# Patient Record
Sex: MALE | Race: BLACK OR AFRICAN AMERICAN | NOT HISPANIC OR LATINO | Employment: OTHER | URBAN - METROPOLITAN AREA
[De-identification: names, ages, dates, MRNs, and addresses within clinical notes are randomized per-mention and may not be internally consistent; named-entity substitution may affect disease eponyms.]

---

## 2017-06-23 PROCEDURE — 96361 HYDRATE IV INFUSION ADD-ON: CPT

## 2017-06-23 PROCEDURE — 96374 THER/PROPH/DIAG INJ IV PUSH: CPT

## 2017-06-23 PROCEDURE — 99284 EMERGENCY DEPT VISIT MOD MDM: CPT | Mod: 25

## 2017-06-24 ENCOUNTER — HOSPITAL ENCOUNTER (EMERGENCY)
Facility: HOSPITAL | Age: 47
Discharge: HOME | End: 2017-06-24
Attending: EMERGENCY MEDICINE
Payer: COMMERCIAL

## 2017-06-24 VITALS
BODY MASS INDEX: 23.19 KG/M2 | RESPIRATION RATE: 18 BRPM | WEIGHT: 175 LBS | DIASTOLIC BLOOD PRESSURE: 78 MMHG | HEIGHT: 73 IN | HEART RATE: 62 BPM | SYSTOLIC BLOOD PRESSURE: 142 MMHG | TEMPERATURE: 98 F | OXYGEN SATURATION: 96 %

## 2017-06-24 DIAGNOSIS — K59.00 CONSTIPATION, UNSPECIFIED CONSTIPATION TYPE: ICD-10-CM

## 2017-06-24 DIAGNOSIS — R10.84 GENERALIZED ABDOMINAL PAIN: Primary | ICD-10-CM

## 2017-06-24 LAB
ALBUMIN SERPL BCP-MCNC: 4 G/DL
ALP SERPL-CCNC: 59 U/L
ALT SERPL W/O P-5'-P-CCNC: 19 U/L
ANION GAP SERPL CALC-SCNC: 10 MMOL/L
AST SERPL-CCNC: 15 U/L
BASOPHILS # BLD AUTO: 0.03 K/UL
BASOPHILS NFR BLD: 0.6 %
BILIRUB SERPL-MCNC: 0.5 MG/DL
BILIRUB UR QL STRIP: NEGATIVE
BUN SERPL-MCNC: 16 MG/DL
CALCIUM SERPL-MCNC: 9.1 MG/DL
CHLORIDE SERPL-SCNC: 104 MMOL/L
CLARITY UR: CLEAR
CO2 SERPL-SCNC: 27 MMOL/L
COLOR UR: YELLOW
CREAT SERPL-MCNC: 1 MG/DL
DIFFERENTIAL METHOD: NORMAL
EOSINOPHIL # BLD AUTO: 0.2 K/UL
EOSINOPHIL NFR BLD: 4.9 %
ERYTHROCYTE [DISTWIDTH] IN BLOOD BY AUTOMATED COUNT: 12.7 %
EST. GFR  (AFRICAN AMERICAN): >60 ML/MIN/1.73 M^2
EST. GFR  (NON AFRICAN AMERICAN): >60 ML/MIN/1.73 M^2
GLUCOSE SERPL-MCNC: 96 MG/DL
GLUCOSE UR QL STRIP: NEGATIVE
HCT VFR BLD AUTO: 43.4 %
HGB BLD-MCNC: 14.3 G/DL
HGB UR QL STRIP: ABNORMAL
KETONES UR QL STRIP: NEGATIVE
LEUKOCYTE ESTERASE UR QL STRIP: NEGATIVE
LYMPHOCYTES # BLD AUTO: 1.8 K/UL
LYMPHOCYTES NFR BLD: 37.5 %
MCH RBC QN AUTO: 29.5 PG
MCHC RBC AUTO-ENTMCNC: 32.9 %
MCV RBC AUTO: 90 FL
MONOCYTES # BLD AUTO: 0.3 K/UL
MONOCYTES NFR BLD: 5.8 %
NEUTROPHILS # BLD AUTO: 2.4 K/UL
NEUTROPHILS NFR BLD: 51.2 %
NITRITE UR QL STRIP: NEGATIVE
PH UR STRIP: 7 [PH] (ref 5–8)
PLATELET # BLD AUTO: 174 K/UL
PMV BLD AUTO: 9.7 FL
POTASSIUM SERPL-SCNC: 4 MMOL/L
PROT SERPL-MCNC: 7.4 G/DL
PROT UR QL STRIP: NEGATIVE
RBC # BLD AUTO: 4.85 M/UL
SODIUM SERPL-SCNC: 141 MMOL/L
SP GR UR STRIP: 1.01 (ref 1–1.03)
URN SPEC COLLECT METH UR: ABNORMAL
UROBILINOGEN UR STRIP-ACNC: 1 EU/DL
WBC # BLD AUTO: 4.69 K/UL

## 2017-06-24 PROCEDURE — 81003 URINALYSIS AUTO W/O SCOPE: CPT

## 2017-06-24 PROCEDURE — 63600175 PHARM REV CODE 636 W HCPCS: Performed by: NURSE PRACTITIONER

## 2017-06-24 PROCEDURE — 25000003 PHARM REV CODE 250: Performed by: NURSE PRACTITIONER

## 2017-06-24 PROCEDURE — 80053 COMPREHEN METABOLIC PANEL: CPT

## 2017-06-24 PROCEDURE — 85025 COMPLETE CBC W/AUTO DIFF WBC: CPT

## 2017-06-24 RX ORDER — POLYETHYLENE GLYCOL 3350 17 G/17G
17 POWDER, FOR SOLUTION ORAL DAILY
Qty: 12 PACKET | Refills: 0 | Status: SHIPPED | OUTPATIENT
Start: 2017-06-24

## 2017-06-24 RX ORDER — DICYCLOMINE HYDROCHLORIDE 20 MG/1
20 TABLET ORAL 2 TIMES DAILY
Qty: 20 TABLET | Refills: 0 | Status: SHIPPED | OUTPATIENT
Start: 2017-06-24 | End: 2017-07-24

## 2017-06-24 RX ORDER — KETOROLAC TROMETHAMINE 30 MG/ML
15 INJECTION, SOLUTION INTRAMUSCULAR; INTRAVENOUS
Status: COMPLETED | OUTPATIENT
Start: 2017-06-24 | End: 2017-06-24

## 2017-06-24 RX ADMIN — KETOROLAC TROMETHAMINE 15 MG: 30 INJECTION, SOLUTION INTRAMUSCULAR; INTRAVENOUS at 12:06

## 2017-06-24 RX ADMIN — SODIUM CHLORIDE 1000 ML: 0.9 INJECTION, SOLUTION INTRAVENOUS at 12:06

## 2017-06-24 NOTE — ED PROVIDER NOTES
SCRIBE #1 NOTE: I, Susy Ochoable, am scribing for, and in the presence of, Barrington Tai NP. I have scribed the entire note.      History      Chief Complaint   Patient presents with    Abdominal Pain     Pt reports generalized abd and bilateral flank pain x months. Denies N/V/D       Review of patient's allergies indicates:  No Known Allergies     HPI   HPI    6/24/2017, 12:40 AM   History obtained from the patient      History of Present Illness: Juan Price is a 47 y.o. male patient who presents to the Emergency Department for lower abdominal pain radiating to the groin and upper abdomen which onset gradually over the last month. Symptoms wax and wane and are moderate in severity.  Pain is mitigated upon leaning over. Pt states it feels like his kidneys are hurting. No associated sxs reported. Patient denies any n/v/d, fever, chills, dysuria, hematuria, constipation, and all other sxs at this time. No prior tx reported. No further complaints or concerns at this time.         Arrival mode: Personal vehicle     PCP: May Rodriguez MD       Past Medical History:  Past Medical History:   Diagnosis Date    Anxiety        Past Surgical History:  Past Surgical History:   Procedure Laterality Date    CHOLECYSTECTOMY           Family History:  History reviewed. No pertinent family history.    Social History:  Social History     Social History Main Topics    Smoking status: Never Smoker    Smokeless tobacco: Unknown    Alcohol use Yes      Comment: drinks beer once a week    Drug use:      Types: Marijuana    Sexual activity: Yes     Partners: Female       ROS   Review of Systems   Constitutional: Negative for chills and fever.   HENT: Negative for sore throat.    Respiratory: Negative for shortness of breath.    Cardiovascular: Negative for chest pain.   Gastrointestinal: Positive for abdominal pain (lower). Negative for diarrhea, nausea and vomiting.   Genitourinary: Negative for dysuria.  "  Musculoskeletal: Negative for back pain and neck pain.   Skin: Negative for rash.   Neurological: Negative for weakness and numbness.   Hematological: Does not bruise/bleed easily.   All other systems reviewed and are negative.      Physical Exam      Initial Vitals [06/24/17 0000]   BP Pulse Resp Temp SpO2   (!) 172/80 63 16 98 °F (36.7 °C) 96 %      MAP       110.67          Physical Exam  Nursing Notes and Vital Signs Reviewed.  Constitutional: Patient is in no apparent distress. Well-developed and well-nourished.  Head: Atraumatic. Normocephalic.  Eyes: PERRL. EOM intact. Conjunctivae are not pale. No scleral icterus.  ENT: Mucous membranes are moist. Oropharynx is clear and symmetric.    Neck: Supple. Full ROM. No lymphadenopathy.  Cardiovascular: Regular rate. Regular rhythm. No murmurs, rubs, or gallops. Distal pulses are 2+ and symmetric.  Pulmonary/Chest: No respiratory distress. Clear to auscultation bilaterally. No wheezing, rales, or rhonchi.  Abdominal: Soft and non-distended.  There is no tenderness.  No rebound, guarding, or rigidity. Good bowel sounds.  Genitourinary: No CVA tenderness  Musculoskeletal: Moves all extremities. No obvious deformities. No edema. No calf tenderness.  Skin: Warm and dry.  Neurological:  Alert, awake, and appropriate.  Normal speech.  No acute focal neurological deficits are appreciated.  Psychiatric: Normal affect. Good eye contact. Appropriate in content.    ED Course    Procedures  ED Vital Signs:  Vitals:    06/24/17 0000 06/24/17 0246   BP: (!) 172/80 (!) 142/78   Pulse: 63 62   Resp: 16 18   Temp: 98 °F (36.7 °C)    TempSrc: Oral    SpO2: 96%    Weight: 79.4 kg (175 lb)    Height: 6' 1" (1.854 m)        Abnormal Lab Results:  Labs Reviewed   URINALYSIS - Abnormal; Notable for the following:        Result Value    Occult Blood UA Trace (*)     All other components within normal limits   CBC W/ AUTO DIFFERENTIAL   COMPREHENSIVE METABOLIC PANEL        All Lab " Results:  Results for orders placed or performed during the hospital encounter of 06/24/17   CBC auto differential   Result Value Ref Range    WBC 4.69 3.90 - 12.70 K/uL    RBC 4.85 4.60 - 6.20 M/uL    Hemoglobin 14.3 14.0 - 18.0 g/dL    Hematocrit 43.4 40.0 - 54.0 %    MCV 90 82 - 98 fL    MCH 29.5 27.0 - 31.0 pg    MCHC 32.9 32.0 - 36.0 %    RDW 12.7 11.5 - 14.5 %    Platelets 174 150 - 350 K/uL    MPV 9.7 9.2 - 12.9 fL    Gran # 2.4 1.8 - 7.7 K/uL    Lymph # 1.8 1.0 - 4.8 K/uL    Mono # 0.3 0.3 - 1.0 K/uL    Eos # 0.2 0.0 - 0.5 K/uL    Baso # 0.03 0.00 - 0.20 K/uL    Gran% 51.2 38.0 - 73.0 %    Lymph% 37.5 18.0 - 48.0 %    Mono% 5.8 4.0 - 15.0 %    Eosinophil% 4.9 0.0 - 8.0 %    Basophil% 0.6 0.0 - 1.9 %    Differential Method Automated    Comprehensive metabolic panel   Result Value Ref Range    Sodium 141 136 - 145 mmol/L    Potassium 4.0 3.5 - 5.1 mmol/L    Chloride 104 95 - 110 mmol/L    CO2 27 23 - 29 mmol/L    Glucose 96 70 - 110 mg/dL    BUN, Bld 16 6 - 20 mg/dL    Creatinine 1.0 0.5 - 1.4 mg/dL    Calcium 9.1 8.7 - 10.5 mg/dL    Total Protein 7.4 6.0 - 8.4 g/dL    Albumin 4.0 3.5 - 5.2 g/dL    Total Bilirubin 0.5 0.1 - 1.0 mg/dL    Alkaline Phosphatase 59 55 - 135 U/L    AST 15 10 - 40 U/L    ALT 19 10 - 44 U/L    Anion Gap 10 8 - 16 mmol/L    eGFR if African American >60 >60 mL/min/1.73 m^2    eGFR if non African American >60 >60 mL/min/1.73 m^2   Urinalysis - clean catch   Result Value Ref Range    Specimen UA Urine, Clean Catch     Color, UA Yellow Yellow, Straw, Larissa    Appearance, UA Clear Clear    pH, UA 7.0 5.0 - 8.0    Specific Gravity, UA 1.015 1.005 - 1.030    Protein, UA Negative Negative    Glucose, UA Negative Negative    Ketones, UA Negative Negative    Bilirubin (UA) Negative Negative    Occult Blood UA Trace (A) Negative    Nitrite, UA Negative Negative    Urobilinogen, UA 1.0 <2.0 EU/dL    Leukocytes, UA Negative Negative         Imaging Results:  Imaging Results          CT Renal Stone  Study ABD Pelvis WO (Final result)  Result time 06/24/17 09:26:45    Final result by Tavon Montes MD (06/24/17 09:26:45)                 Impression:      No acute findings. Evidence of constipation.    All CT scans at this facility use dose modulation, iterative reconstruction, and/or weight based dosing when appropriate to reduce radiation dose to as low as reasonably achievable.       Electronically signed by: TAVON MONTES MD  Date:     06/24/17  Time:    09:26              Narrative:    Exam: CT RENAL STONE STUDY ABD PELVIS WO,    Date:  06/24/17 01:18:18    History: Lower abdominal and groin pain        Findings: No evidence of urinary tract stones. Prior cholecystectomy. No abnormality of the unenhanced liver, spleen, pancreas, or left kidney is seen. There is a small right renal cortical cyst which is stable compared to 10/12/15. No evidence to indicate appendicitis. No evidence of acute inflammatory process in the abdomen or pelvis. No free fluid. Moderately large amount of stool throughout the colon and rectum consistent with constipation. The lung bases are clear.                             Virtual impression: Findings suggest so degree of constipation. No evidence of bowel obstruction.          The Emergency Provider reviewed the vital signs and test results, which are outlined above.    ED Discussion     2:31 AM   Reevaluation: The patient feels better and is resting comfortably. Pt states symptoms are improved. Discussed test results, shared treatment plan, specific conditions for return, and the importance of follow up. Pt feels comfortable with the plan as discussed. Answered questions at this time. Patient has remained hemodynamically stable throughout ED course and is stable for discharge.     I discussed with patient and/or family/caretaker that evaluation in the ED does not suggest any emergent or life threatening medical conditions requiring immediate intervention beyond what was  provided in the ED, and I believe patient is safe for discharge.  Regardless, an unremarkable evaluation in the ED does not preclude the development or presence of a serious of life threatening condition. As such, patient was instructed to return immediately for any worsening or change in current symptoms.      ED Medication(s):  Medications   sodium chloride 0.9% bolus 1,000 mL (0 mLs Intravenous Stopped 6/24/17 0144)   ketorolac injection 15 mg (15 mg Intravenous Given 6/24/17 0059)       Discharge Medication List as of 6/24/2017  2:31 AM      START taking these medications    Details   dicyclomine (BENTYL) 20 mg tablet Take 1 tablet (20 mg total) by mouth 2 (two) times daily., Starting Sat 6/24/2017, Until Mon 7/24/2017, Print      polyethylene glycol (GLYCOLAX) 17 gram PwPk Take 17 g by mouth once daily., Starting Sat 6/24/2017, Print             Follow-up Information     May Rodriguez MD. Schedule an appointment as soon as possible for a visit in 2 days.    Specialty:  Internal Medicine  Contact information:  [address] [address] [phone]             [facility]    Specialty:  Emergency Medicine  Why:  As needed, If symptoms worsen  Contact information:  [address] [facility] [address] [phone]                 Pre-hypertension/Hypertension: The pt has been informed that they may have pre-hypertension or hypertension based on a blood pressure reading in the ED. I recommend that the pt call the PCP listed on their discharge instructions or a physician of their choice this week to arrange f/u for further evaluation of possible pre-hypertension or hypertension.       Medical Decision Making              Scribe Attestation:   Scribe #1: I performed the above scribed service and the documentation accurately describes the services I performed. I attest to the accuracy of the note.    Attending:   Physician Attestation Statement  for Scribe #1: I, Barrington Tai NP, personally performed the services described in this documentation, as scribed by Susy Raygoza, in my presence, and it is both accurate and complete.          Clinical Impression       ICD-10-CM ICD-9-CM   1. Generalized abdominal pain R10.84 789.07   2. Constipation, unspecified constipation type K59.00 564.00               Barrington Tai NP  06/24/17 2227

## 2018-08-16 ENCOUNTER — HOSPITAL ENCOUNTER (EMERGENCY)
Facility: HOSPITAL | Age: 48
Discharge: HOME | End: 2018-08-16
Attending: EMERGENCY MEDICINE
Payer: COMMERCIAL

## 2018-08-16 VITALS
WEIGHT: 169.75 LBS | RESPIRATION RATE: 20 BRPM | BODY MASS INDEX: 22.5 KG/M2 | OXYGEN SATURATION: 97 % | SYSTOLIC BLOOD PRESSURE: 125 MMHG | HEART RATE: 77 BPM | DIASTOLIC BLOOD PRESSURE: 81 MMHG | HEIGHT: 73 IN | TEMPERATURE: 98 F

## 2018-08-16 DIAGNOSIS — R10.9 ABDOMINAL PAIN, UNSPECIFIED ABDOMINAL LOCATION: Primary | ICD-10-CM

## 2018-08-16 LAB
ALBUMIN SERPL BCP-MCNC: 4.2 G/DL
ALP SERPL-CCNC: 54 U/L
ALT SERPL W/O P-5'-P-CCNC: 22 U/L
ANION GAP SERPL CALC-SCNC: 9 MMOL/L
AST SERPL-CCNC: 18 U/L
BASOPHILS # BLD AUTO: 0.01 K/UL
BASOPHILS NFR BLD: 0.3 %
BILIRUB SERPL-MCNC: 0.9 MG/DL
BILIRUB UR QL STRIP: NEGATIVE
BUN SERPL-MCNC: 8 MG/DL
CALCIUM SERPL-MCNC: 9.3 MG/DL
CHLORIDE SERPL-SCNC: 104 MMOL/L
CLARITY UR: CLEAR
CO2 SERPL-SCNC: 28 MMOL/L
COLOR UR: YELLOW
CREAT SERPL-MCNC: 0.9 MG/DL
DIFFERENTIAL METHOD: ABNORMAL
EOSINOPHIL # BLD AUTO: 0.2 K/UL
EOSINOPHIL NFR BLD: 4.3 %
ERYTHROCYTE [DISTWIDTH] IN BLOOD BY AUTOMATED COUNT: 13 %
EST. GFR  (AFRICAN AMERICAN): >60 ML/MIN/1.73 M^2
EST. GFR  (NON AFRICAN AMERICAN): >60 ML/MIN/1.73 M^2
GLUCOSE SERPL-MCNC: 94 MG/DL
GLUCOSE UR QL STRIP: NEGATIVE
HCT VFR BLD AUTO: 40.5 %
HGB BLD-MCNC: 13.3 G/DL
HGB UR QL STRIP: ABNORMAL
KETONES UR QL STRIP: NEGATIVE
LEUKOCYTE ESTERASE UR QL STRIP: NEGATIVE
LIPASE SERPL-CCNC: 18 U/L
LYMPHOCYTES # BLD AUTO: 1.3 K/UL
LYMPHOCYTES NFR BLD: 33.9 %
MCH RBC QN AUTO: 29.4 PG
MCHC RBC AUTO-ENTMCNC: 32.8 G/DL
MCV RBC AUTO: 90 FL
MICROSCOPIC COMMENT: ABNORMAL
MONOCYTES # BLD AUTO: 0.3 K/UL
MONOCYTES NFR BLD: 6.9 %
NEUTROPHILS # BLD AUTO: 2.1 K/UL
NEUTROPHILS NFR BLD: 54.6 %
NITRITE UR QL STRIP: NEGATIVE
PH UR STRIP: 7 [PH] (ref 5–8)
PLATELET # BLD AUTO: 165 K/UL
PMV BLD AUTO: 9.7 FL
POTASSIUM SERPL-SCNC: 3.9 MMOL/L
PROT SERPL-MCNC: 7.4 G/DL
PROT UR QL STRIP: NEGATIVE
RBC # BLD AUTO: 4.52 M/UL
RBC #/AREA URNS HPF: 5 /HPF (ref 0–4)
SODIUM SERPL-SCNC: 141 MMOL/L
SP GR UR STRIP: 1.02 (ref 1–1.03)
URN SPEC COLLECT METH UR: ABNORMAL
UROBILINOGEN UR STRIP-ACNC: NEGATIVE EU/DL
WBC # BLD AUTO: 3.75 K/UL

## 2018-08-16 PROCEDURE — 81000 URINALYSIS NONAUTO W/SCOPE: CPT

## 2018-08-16 PROCEDURE — 85025 COMPLETE CBC W/AUTO DIFF WBC: CPT

## 2018-08-16 PROCEDURE — 83690 ASSAY OF LIPASE: CPT

## 2018-08-16 PROCEDURE — 80053 COMPREHEN METABOLIC PANEL: CPT

## 2018-08-16 PROCEDURE — 99283 EMERGENCY DEPT VISIT LOW MDM: CPT

## 2018-08-16 NOTE — ED PROVIDER NOTES
SCRIBE #1 NOTE: I, Jazmin Alvarez, am scribing for, and in the presence of, No att. providers found. I have scribed the entire note.      History      Chief Complaint   Patient presents with    Flank Pain     Right sided x 2 weeks, gradually getting worse.       Review of patient's allergies indicates:  No Known Allergies     HPI   HPI    8/16/2018, 1:21 AM   History obtained from the patient      History of Present Illness: Juan Price is a 48 y.o. male patient who presents to the Emergency Department for R side/RLQ pain which onset 2 months ago. Pt reports that sxs worsened two weeks ago. He characterizes the pain as a stabbing. He states that he sometimes has groin pain and nausea with this pain, but not currently. Symptoms are intermitent and moderate in severity. No mitigating or exacerbating factors reported. No other sxs currently. Patient denies any current n/v/d, constipation, hematochezia, difficulty urinating, dysuria, hematuria, frequency, dizziness, HA, fever, chills, and all other sxs at this time. No further complaints or concerns at this time.         Arrival mode: Personal vehicle    PCP: May Rodriguez MD       Past Medical History:  Past Medical History:   Diagnosis Date    Anxiety        Past Surgical History:  Past Surgical History:   Procedure Laterality Date    CHOLECYSTECTOMY           Family History:  No family history on file.    Social History:  Social History     Tobacco Use    Smoking status: Never Smoker   Substance and Sexual Activity    Alcohol use: Yes     Comment: drinks beer once a week    Drug use: Yes     Types: Marijuana    Sexual activity: Yes     Partners: Female       ROS   Review of Systems   Constitutional: Negative for chills and fever.   HENT: Negative for sore throat.    Respiratory: Negative for shortness of breath.    Cardiovascular: Negative for chest pain.   Gastrointestinal: Positive for abdominal pain (RLQ and R side). Negative for blood in stool,  "constipation, diarrhea, nausea and vomiting.   Genitourinary: Negative for difficulty urinating, dysuria, frequency and hematuria.   Musculoskeletal: Negative for back pain.   Skin: Negative for rash.   Neurological: Negative for dizziness, weakness, light-headedness and headaches.   Hematological: Does not bruise/bleed easily.   All other systems reviewed and are negative.      Physical Exam      Initial Vitals [08/16/18 0008]   BP Pulse Resp Temp SpO2   (!) 161/91 66 20 98.2 °F (36.8 °C) 99 %      MAP       --          Physical Exam  Nursing Notes and Vital Signs Reviewed.  Constitutional: Patient is in no acute distress. Well-developed and well-nourished.  Head: Atraumatic. Normocephalic.  Eyes: PERRL. EOM intact. Conjunctivae are not pale. No scleral icterus.  ENT: Mucous membranes are moist. Oropharynx is clear and symmetric.    Neck: Supple. Full ROM. No lymphadenopathy.  Cardiovascular: Regular rate. Regular rhythm. No murmurs, rubs, or gallops. Distal pulses are 2+ and symmetric.  Pulmonary/Chest: No respiratory distress. Clear to auscultation bilaterally. No wheezing or rales.  Abdominal: Soft and non-distended.  There is no tenderness.  No rebound, guarding, or rigidity. Good bowel sounds.  Genitourinary: No CVA tenderness  Musculoskeletal: Moves all extremities. No obvious deformities. No edema. No calf tenderness.  Skin: Warm and dry.  Neurological:  Alert, awake, and appropriate.  Normal speech.  No acute focal neurological deficits are appreciated.  Psychiatric: Normal affect. Good eye contact. Appropriate in content.    ED Course    Procedures  ED Vital Signs:  Vitals:    08/16/18 0008 08/16/18 0239   BP: (!) 161/91 125/81   Pulse: 66 77   Resp: 20 20   Temp: 98.2 °F (36.8 °C)    TempSrc: Oral    SpO2: 99% 97%   Weight: 77 kg (169 lb 12.1 oz)    Height: 6' 1" (1.854 m)        Abnormal Lab Results:  Labs Reviewed   URINALYSIS, REFLEX TO URINE CULTURE - Abnormal; Notable for the following components:    "    Result Value    Occult Blood UA 2+ (*)     All other components within normal limits    Narrative:     Preferred Collection Type->Urine, Clean Catch   CBC W/ AUTO DIFFERENTIAL - Abnormal; Notable for the following components:    WBC 3.75 (*)     RBC 4.52 (*)     Hemoglobin 13.3 (*)     All other components within normal limits   COMPREHENSIVE METABOLIC PANEL - Abnormal; Notable for the following components:    Alkaline Phosphatase 54 (*)     All other components within normal limits   URINALYSIS MICROSCOPIC - Abnormal; Notable for the following components:    RBC, UA 5 (*)     All other components within normal limits    Narrative:     Preferred Collection Type->Urine, Clean Catch   LIPASE        All Lab Results:  Results for orders placed or performed during the hospital encounter of 08/16/18   Urinalysis, Reflex to Urine Culture Urine, Clean Catch   Result Value Ref Range    Specimen UA Urine, Clean Catch     Color, UA Yellow Yellow, Straw, Larissa    Appearance, UA Clear Clear    pH, UA 7.0 5.0 - 8.0    Specific Gravity, UA 1.020 1.005 - 1.030    Protein, UA Negative Negative    Glucose, UA Negative Negative    Ketones, UA Negative Negative    Bilirubin (UA) Negative Negative    Occult Blood UA 2+ (A) Negative    Nitrite, UA Negative Negative    Urobilinogen, UA Negative <2.0 EU/dL    Leukocytes, UA Negative Negative   CBC W/ AUTO DIFFERENTIAL   Result Value Ref Range    WBC 3.75 (L) 3.90 - 12.70 K/uL    RBC 4.52 (L) 4.60 - 6.20 M/uL    Hemoglobin 13.3 (L) 14.0 - 18.0 g/dL    Hematocrit 40.5 40.0 - 54.0 %    MCV 90 82 - 98 fL    MCH 29.4 27.0 - 31.0 pg    MCHC 32.8 32.0 - 36.0 g/dL    RDW 13.0 11.5 - 14.5 %    Platelets 165 150 - 350 K/uL    MPV 9.7 9.2 - 12.9 fL    Gran # (ANC) 2.1 1.8 - 7.7 K/uL    Lymph # 1.3 1.0 - 4.8 K/uL    Mono # 0.3 0.3 - 1.0 K/uL    Eos # 0.2 0.0 - 0.5 K/uL    Baso # 0.01 0.00 - 0.20 K/uL    Gran% 54.6 38.0 - 73.0 %    Lymph% 33.9 18.0 - 48.0 %    Mono% 6.9 4.0 - 15.0 %    Eosinophil%  4.3 0.0 - 8.0 %    Basophil% 0.3 0.0 - 1.9 %    Differential Method Automated    Comp. Metabolic Panel   Result Value Ref Range    Sodium 141 136 - 145 mmol/L    Potassium 3.9 3.5 - 5.1 mmol/L    Chloride 104 95 - 110 mmol/L    CO2 28 23 - 29 mmol/L    Glucose 94 70 - 110 mg/dL    BUN, Bld 8 6 - 20 mg/dL    Creatinine 0.9 0.5 - 1.4 mg/dL    Calcium 9.3 8.7 - 10.5 mg/dL    Total Protein 7.4 6.0 - 8.4 g/dL    Albumin 4.2 3.5 - 5.2 g/dL    Total Bilirubin 0.9 0.1 - 1.0 mg/dL    Alkaline Phosphatase 54 (L) 55 - 135 U/L    AST 18 10 - 40 U/L    ALT 22 10 - 44 U/L    Anion Gap 9 8 - 16 mmol/L    eGFR if African American >60 >60 mL/min/1.73 m^2    eGFR if non African American >60 >60 mL/min/1.73 m^2   Lipase   Result Value Ref Range    Lipase 18 4 - 60 U/L   Urinalysis Microscopic   Result Value Ref Range    RBC, UA 5 (H) 0 - 4 /hpf    Microscopic Comment SEE COMMENT          Imaging Results:  Imaging Results    None                 The Emergency Provider reviewed the vital signs and test results, which are outlined above.    ED Discussion     2:22 AM: Reassessed pt at this time.  Pt states his condition has improved at this time. Discussed with pt all pertinent ED information and results. Discussed pt dx and plan of tx. Gave pt all f/u and return to the ED instructions. All questions and concerns were addressed at this time. Pt expresses understanding of information and instructions, and is comfortable with plan to discharge. Pt is stable for discharge.    I discussed with patient and/or family/caretaker that evaluation in the ED does not suggest any emergent or life threatening medical conditions requiring immediate intervention beyond what was provided in the ED, and I believe patient is safe for discharge.  Regardless, an unremarkable evaluation in the ED does not preclude the development or presence of a serious of life threatening condition. As such, patient was instructed to return immediately for any worsening or  change in current symptoms.      ED Medication(s):  Medications - No data to display    Discharge Medication List as of 8/16/2018  2:20 AM          Follow-up Information     May Rodriguez MD In 2 days.    Specialty:  Internal Medicine  Contact information:  [address] [facility] [address] [phone]             [facility]    Specialty:  Emergency Medicine  Why:  As needed, If symptoms worsen  Contact information:  [address] [facility] [address] [phone]                   Medical Decision Making    Medical Decision Making:   Clinical Tests:   Lab Tests: Ordered and Reviewed           Scribe Attestation:   Scribe #1: I performed the above scribed service and the documentation accurately describes the services I performed. I attest to the accuracy of the note.    Attending:   Physician Attestation Statement for Scribe #1: I, Jazmin Alvarez MD, personally performed the services described in this documentation, as scribed by Danilo Ragsdale, in my presence, and it is both accurate and complete.          Clinical Impression       ICD-10-CM ICD-9-CM   1. Abdominal pain, unspecified abdominal location R10.9 789.00       Disposition:   Disposition: Discharged  Condition: Stable         Jazmin Alvarez MD  08/16/18 0551

## 2020-06-03 NOTE — PROGRESS NOTES
Referring Provider:    Amadeo Olivarez Md  [address] [address]  Subjective:   Patient: Juan Price [id number], :1970   Visit date:2020 11:24 PM    Chief Complaint:  Neck Mass (per Amadeo Olivarez )    HPI:  Juan is a 50 y.o. male who I was asked to see in consultation for evaluation of the following issue(s):    Right neck mass.  Prior evaluation by PCP about a few years ago and it was not felt to be cancerous. Intermittent discomfort.  Slowly enlarging.  No overlying skin changes.     Dysphagia: No  Odynophagia: No  Hemoptysis:  No  Unintentional Weight loss:  No  Other history worrisome for head and neck malignancy: none        Review of Systems:  Negative unless checked off.  Gen:  []fever   []fatigue  HENT:  []nosebleeds  []dental problem   Eyes:  []photophobia  []visual disturbance  Resp:  []chest tightness []wheezing  Card:  []chest pain  []leg swelling  GI:  []abdominal pain []blood in stool  :  []dysuria  []hematuria  Musc:  []joint swelling  []gait problem  Skin:  []color change  []pallor  Neuro:  []seizures  []numbness  Hem:  []bruise/bleed easily  Psych:  []hallucinations  []behavioral problems  Allergy/Imm: has No Known Allergies.    His meds, allergies, medical, surgical, social & family histories were reviewed & updated:  -     He has a current medication list which includes the following prescription(s): cyclobenzaprine, tamsulosin, tramadol, pantoprazole, and polyethylene glycol.  -     He  has a past medical history of Anxiety.   -     He  has a past surgical history that includes Cholecystectomy.  -     He  reports that he has never smoked. He does not have any smokeless tobacco history on file. He reports that he drinks alcohol. He reports that he has current or past drug history. Drug: Marijuana.  -     His family history is not on file.  -     He has No Known Allergies.    Objective:     Physical Exam:  Vitals:  /81   Pulse 71   Wt 72.9 kg  (160 lb 11.5 oz)   BMI 21.20 kg/m²   General appearance:  Well developed, well nourished    Eyes:  Extraocular motions intact, PERRL    Communication:  no hoarseness, no dysphonia    Ears:  Otoscopy of external auditory canals and tympanic membranes was normal, clinical speech reception thresholds grossly intact, no mass/lesion of auricle.  Nose:  No masses/lesions of external nose, nasal mucosa, septum, and turbinates were within normal limits.  Mouth:  No mass/lesion of lips, teeth, gums, hard/soft palate, tongue, tonsils, or oropharynx.    Cardiovascular:  No pedal edema; Radial Pulses +2     Neck & Lymphatics:  trachea is midline, no thyroid enlargement/tenderness/mass. 2.5 cm right neck mass near mastoid tip, fairly firm, mobile, NTTP    Psych: Oriented x3,  Alert with normal mood and affect.     Respiration/Chest:  Symmetric expansion during respiration, normal respiratory effort.    Skin:  Warm and intact. No ulcerations of face, scalp, neck.    Assessment & Plan:   Juan was seen today for neck mass.    Diagnoses and all orders for this visit:    Neck mass  -     Cytology-FNA Non-Radiology Clinician Performed w/o on site      Right neck mass.  Discussed numerous benign and malignant neoplasms with patient and corresponding treatment options.  Will contact when pathology is back.  Plan for simple excision if benign.  If not, treatment to be based on specific pathology.       Thank you for allowing me to participate in the care of Juan.       Reji Olivarez MD, FACS  [facility]   [facility]  [address]  [place] [address]  P: [phone]  F: [phone]    Procedure: Ultrasound-guided FNA of right neck mass    Clinical History:    neck mass    Technique/findings: After the risks, benefits and options of the planned procedure were explained to the patient in full detail, the patient expressed complete understanding and gave signed informed consent.  The skin  overlying this area was prepped and draped in the usual sterile fashion. A timeout was performed. 1% lidocaine was used as a local anesthetic.  The mass/nodule was visualized with ultrasound and each needle was visualized to enter the intended biopsy site. Each nodule had 3 passes with 25 gauge needles and 1 22 gauge needle.  These were placed separately onto slides.    Locations biopsied:  1. Right neck near mastoid tip      Post procedure ultrasound fail to demonstrate evidence for a post procedure hemorrhage or other complication. A sterile dressing was applied.  The patient tolerated the procedure well without complication comment departing the ultrasound suite in unchanged condition.   Impression       1. Successful ultrasound-guided FNA.      Reji Olivarez    06/08/2020   3:01 PM

## 2020-06-04 ENCOUNTER — OFFICE VISIT (OUTPATIENT)
Dept: OTOLARYNGOLOGY | Facility: CLINIC | Age: 50
End: 2020-06-04
Payer: COMMERCIAL

## 2020-06-04 VITALS
SYSTOLIC BLOOD PRESSURE: 128 MMHG | WEIGHT: 160.69 LBS | HEART RATE: 71 BPM | BODY MASS INDEX: 21.2 KG/M2 | DIASTOLIC BLOOD PRESSURE: 81 MMHG

## 2020-06-04 DIAGNOSIS — R22.1 NECK MASS: Primary | ICD-10-CM

## 2020-06-04 PROCEDURE — 88173 CYTOPATH EVAL FNA REPORT: CPT | Mod: 26,,, | Performed by: PATHOLOGY

## 2020-06-04 PROCEDURE — 10005 PR FINE NEEDLE ASP BIOPSY, W/US GUIDANCE, 1ST LESION: ICD-10-PCS | Mod: S$GLB,,, | Performed by: OTOLARYNGOLOGY

## 2020-06-04 PROCEDURE — 99999 PR PBB SHADOW E&M-EST. PATIENT-LVL II: CPT | Mod: PBBFAC,,, | Performed by: OTOLARYNGOLOGY

## 2020-06-04 PROCEDURE — 88173 PR  INTERPRETATION OF FNA SMEAR: ICD-10-PCS | Mod: 26,,, | Performed by: PATHOLOGY

## 2020-06-04 PROCEDURE — 99999 PR PBB SHADOW E&M-EST. PATIENT-LVL II: ICD-10-PCS | Mod: PBBFAC,,, | Performed by: OTOLARYNGOLOGY

## 2020-06-04 PROCEDURE — 10005 FNA BX W/US GDN 1ST LES: CPT | Mod: S$GLB,,, | Performed by: OTOLARYNGOLOGY

## 2020-06-04 PROCEDURE — 88173 CYTOPATH EVAL FNA REPORT: CPT | Performed by: PATHOLOGY

## 2020-06-04 PROCEDURE — 99244 PR OFFICE CONSULTATION,LEVEL IV: ICD-10-PCS | Mod: 25,S$GLB,, | Performed by: OTOLARYNGOLOGY

## 2020-06-04 PROCEDURE — 99244 OFF/OP CNSLTJ NEW/EST MOD 40: CPT | Mod: 25,S$GLB,, | Performed by: OTOLARYNGOLOGY

## 2020-06-04 RX ORDER — TAMSULOSIN HYDROCHLORIDE 0.4 MG/1
CAPSULE ORAL
COMMUNITY
Start: 2020-06-01

## 2020-06-04 RX ORDER — TRAMADOL HYDROCHLORIDE 50 MG/1
TABLET ORAL
COMMUNITY
Start: 2020-06-01

## 2020-06-04 RX ORDER — CYCLOBENZAPRINE HCL 10 MG
TABLET ORAL
COMMUNITY
Start: 2020-06-01

## 2020-06-04 NOTE — LETTER
June 8, 2020      Amadeo Olivarez MD  [address]  [facility] [address]           [facility]  [address] [address]  Phone: [phone]  Fax: [phone]          Patient: Juan Price   MR Number: [id number]   YOB: 1970   Date of Visit: 6/4/2020       Dear Dr. Amadeo Olivarez:    Thank you for referring Juan Price to me for evaluation. Attached you will find relevant portions of my assessment and plan of care.    If you have questions, please do not hesitate to call me. I look forward to following Juan Price along with you.    Sincerely,    Reji Olivarez MD    Enclosure  CC:  No Recipients    If you would like to receive this communication electronically, please contact [email or web address] or [phone] to request more information on [facility] access.    For providers and/or their staff who would like to refer a patient to [facility], please contact us through our one-stop-shop provider referral line, [facility] , at [phone].    If you feel you have received this communication in error or would no longer like to receive these types of communications, please e-mail [email or web address]

## 2020-06-11 LAB — FINAL PATHOLOGIC DIAGNOSIS: NORMAL

## 2020-06-12 ENCOUNTER — TELEPHONE (OUTPATIENT)
Dept: OTOLARYNGOLOGY | Facility: CLINIC | Age: 50
End: 2020-06-12

## 2020-06-12 NOTE — TELEPHONE ENCOUNTER
----- Message from Reji Olivarez MD sent at 6/12/2020 11:24 AM CDT -----  Please let him know that pathology is benign.  We can change follow up to procedure for removal in the office (will need hyfrecator) if he would like or can see me to discuss removal in the OR.

## 2020-06-24 NOTE — PROGRESS NOTES
"  Patient: Juan Price [id number], :1970  Procedure date:2020  Patient's medications, allergies, past medical, surgical, social and family histories were reviewed and updated as appropriate.  Chief Complaint:  No chief complaint on file.    HPI:  Juan is a 50 y.o. male with a mass located ***    Procedure: Risks, benefits, and alternatives of the procedure were discussed with the patient, and the patient consented to the excision of the {left/right/bilateral:710891} *** mass and reconstruction of the defect.  The area was visualized with proper lighting and exposure.  Adequate anesthesia was obtained using ***cc of 1% Lidocaine with 1:100,000 Epinephrine.  An incision was made and the mass was dissected circumferentially until only the medial attachment remained.  The mass entirety of the mass was removed by transecting the base.   ***    The wound {Desc; did/not:27127::"did not"} require suture closure ***.      The patient tolerated the procedure well with no complications.       Assessment & Plan:  - {Blank single:09110::"see today's clinic note"}            "

## 2020-06-25 ENCOUNTER — PROCEDURE VISIT (OUTPATIENT)
Dept: OTOLARYNGOLOGY | Facility: CLINIC | Age: 50
End: 2020-06-25
Payer: COMMERCIAL

## 2020-06-25 VITALS
HEART RATE: 57 BPM | DIASTOLIC BLOOD PRESSURE: 70 MMHG | BODY MASS INDEX: 21.23 KG/M2 | SYSTOLIC BLOOD PRESSURE: 112 MMHG | WEIGHT: 160.94 LBS | TEMPERATURE: 98 F

## 2020-06-25 DIAGNOSIS — R22.1 NECK MASS: Primary | ICD-10-CM

## 2020-06-25 PROCEDURE — 88307 TISSUE EXAM BY PATHOLOGIST: CPT | Mod: 26,,, | Performed by: PATHOLOGY

## 2020-06-25 PROCEDURE — 11423 EXC H-F-NK-SP B9+MARG 2.1-3: CPT | Mod: 51,S$GLB,, | Performed by: OTOLARYNGOLOGY

## 2020-06-25 PROCEDURE — 11423 PR EXC SKIN BENIG 2.1-3 CM REMAINDR BODY: ICD-10-PCS | Mod: 51,S$GLB,, | Performed by: OTOLARYNGOLOGY

## 2020-06-25 PROCEDURE — 88307 TISSUE EXAM BY PATHOLOGIST: CPT | Performed by: PATHOLOGY

## 2020-06-25 PROCEDURE — 12042 INTMD RPR N-HF/GENIT2.6-7.5: CPT | Mod: S$GLB,,, | Performed by: OTOLARYNGOLOGY

## 2020-06-25 PROCEDURE — 12042 PR LAYR CLOS WND REST BODY 2.6-7.5 CM: ICD-10-PCS | Mod: S$GLB,,, | Performed by: OTOLARYNGOLOGY

## 2020-06-25 PROCEDURE — 88307 PR  SURG PATH,LEVEL V: ICD-10-PCS | Mod: 26,,, | Performed by: PATHOLOGY

## 2020-06-25 RX ORDER — CEPHALEXIN 500 MG/1
500 CAPSULE ORAL EVERY 8 HOURS
Qty: 21 CAPSULE | Refills: 0 | Status: SHIPPED | OUTPATIENT
Start: 2020-06-25 | End: 2020-07-02

## 2020-06-25 NOTE — PROCEDURES
Procedures     Patient: Juan Price [id number], :1970  Procedure date:2020  Patient's medications, allergies, past medical, surgical, social and family histories were reviewed and updated as appropriate.  Chief Complaint:  Follow-up (Excision removal neck mass )    HPI:  Juan is a 50 y.o. male with a mass located in the right neck    Procedure: Risks, benefits, and alternatives of the procedure were discussed with the patient, and the patient consented to the excision of the right neck mass and reconstruction of the defect.  The area was visualized with proper lighting and exposure.  Adequate anesthesia was obtained using 5cc of 1% Lidocaine with 1:100,000 Epinephrine.  An incision was made and the mass was dissected circumferentially until only the medial attachment remained.  The mass entirety of the mass was removed by transecting the base.   The mass measured 2x3 cm.     The wound did require suture closure.  It was closed in layers with 4-0 Monocryl followed by 4-0 Prolene.      The patient tolerated the procedure well with no complications.       Assessment & Plan:  - Follow up Tuesday for suture removal

## 2020-06-30 ENCOUNTER — OFFICE VISIT (OUTPATIENT)
Dept: OTOLARYNGOLOGY | Facility: CLINIC | Age: 50
End: 2020-06-30
Payer: COMMERCIAL

## 2020-06-30 VITALS — WEIGHT: 160 LBS | BODY MASS INDEX: 20.53 KG/M2 | TEMPERATURE: 98 F | HEIGHT: 74 IN

## 2020-06-30 DIAGNOSIS — Z98.890 POST-OPERATIVE STATE: Primary | ICD-10-CM

## 2020-06-30 PROCEDURE — 99024 PR POST-OP FOLLOW-UP VISIT: ICD-10-PCS | Mod: S$GLB,,, | Performed by: OTOLARYNGOLOGY

## 2020-06-30 PROCEDURE — 99999 PR PBB SHADOW E&M-EST. PATIENT-LVL III: ICD-10-PCS | Mod: PBBFAC,,, | Performed by: OTOLARYNGOLOGY

## 2020-06-30 PROCEDURE — 99024 POSTOP FOLLOW-UP VISIT: CPT | Mod: S$GLB,,, | Performed by: OTOLARYNGOLOGY

## 2020-06-30 PROCEDURE — 99999 PR PBB SHADOW E&M-EST. PATIENT-LVL III: CPT | Mod: PBBFAC,,, | Performed by: OTOLARYNGOLOGY

## 2020-07-05 LAB
FINAL PATHOLOGIC DIAGNOSIS: NORMAL
GROSS: NORMAL